# Patient Record
Sex: MALE | Race: WHITE | NOT HISPANIC OR LATINO | Employment: FULL TIME | URBAN - METROPOLITAN AREA
[De-identification: names, ages, dates, MRNs, and addresses within clinical notes are randomized per-mention and may not be internally consistent; named-entity substitution may affect disease eponyms.]

---

## 2021-03-29 PROBLEM — Z72.0 TOBACCO USE: Status: ACTIVE | Noted: 2021-03-29

## 2021-03-29 PROBLEM — E78.5 HYPERLIPIDEMIA LDL GOAL <100: Status: ACTIVE | Noted: 2021-03-29

## 2021-03-29 PROBLEM — I10 ESSENTIAL HYPERTENSION: Status: ACTIVE | Noted: 2021-03-29

## 2021-03-29 PROBLEM — R07.2 PRECORDIAL PAIN: Status: ACTIVE | Noted: 2021-03-29

## 2021-03-29 PROBLEM — K21.9 GERD WITHOUT ESOPHAGITIS: Status: ACTIVE | Noted: 2021-03-29

## 2021-03-29 PROBLEM — Z82.49 FAMILY HISTORY OF ATHEROSCLEROSIS: Status: ACTIVE | Noted: 2021-03-29

## 2021-03-29 NOTE — PROGRESS NOTES
Subjective:     Encounter Date:03/31/2021    Patient ID: Matthew Duane Robison is a 50 y.o. 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    REFERRING PROVIDER: RAMU Augero    Chief Complaint:   Chief Complaint   Patient presents with   • Chest Pain     Consult     Problem List:  1. Chest pain - uncertain etiology  a. Reported negative Stress approx. 2010  b. Residual class II symptoms with acceptable electrocardiogram, March 2021  c. Recurrent CP with increased fatigue and SOA March 2021 with plan for repeat Stress Echo GXT  2. Hypertension  3. Hyperlipidemia  a. Lipid panel December 2019 with , triglycerides 224, HDL 33, LDL 45  b. On atorvastatin  4. GERD  5. Tobacco use  a. 2 PPD > 10 years   b. Currently on Chantix with decreasing cigarette consumption.  6. Family history of atherosclerotic heart disease  7. Remote cholecystectomy     No Known Allergies    Current Outpatient Medications:   •  AMLODIPINE BENZOATE PO, Take 10 mg by mouth Every Night., Disp: , Rfl:   •  atorvastatin (LIPITOR) 20 MG tablet, Take 20 mg by mouth Daily., Disp: , Rfl:   •  bisoprolol (ZEBeta) 5 MG tablet, Take 5 mg by mouth Daily., Disp: , Rfl:   •  diphenhydrAMINE HCl (BENADRYL PO), Take  by mouth As Needed. To help sleep, Disp: , Rfl:   •  losartan-hydrochlorothiazide (HYZAAR) 100-25 MG per tablet, Take 1 tablet by mouth Daily., Disp: , Rfl:   •  omeprazole (priLOSEC) 40 MG capsule, Take 40 mg by mouth As Needed., Disp: , Rfl:   •  varenicline (CHANTIX) 1 MG tablet, Take 1 mg by mouth 2 (Two) Times a Day., Disp: , Rfl:   •  nitroglycerin (Nitrolingual) 0.4 MG/SPRAY spray, Place 1 spray under the tongue Every 5 (Five) Minutes As Needed for Chest Pain., Disp: 1 each, Rfl: 12    History of Present Illness  This is a 50-year-old white male who presents to establish care for chest pain and family history of atherosclerotic heart disease.   Upon evaluation today patient reports a 2-month history of increasing fatigue and shortness of breath with minimal activity.  Patient reports chest pain occurring 2-3 times per week primarily occurring after eating associated with acid reflux.  Patient describes chest pain as a sharp stabbing pain substernally that will radiate to his jaw.  Patient states the pain typically last 1 to 2 hours typically at rest and is relieved with antiacids.  Patient denies palpitations, dizziness, presyncope, or syncope.  Patient's blood pressure is noted to be slightly elevated on a consistent basis despite multiple antihypertensive therapies.  Patient states his blood pressure is typically high in the morning and then is improved in the afternoon when he checks his blood pressure at home.  Patient meets compliance to statin therapy without noted side effects and recent acceptable lipid panel.  Patient reports a 2 pack/day smoking history greater than 10 years for which he recently started taking Chantix for smoking cessation however is still smoking approximately half pack per day.  Patient reports having a stress test approximately 10 years ago at [facility] that was reported to be normal.    Cardiovascular Disease Risk Factors  Hypertension, hyperlipidemia, family history, tobacco abuse, male gender    Social History     Socioeconomic History   • Marital status: Single     Spouse name: Not on file   • Number of children: Not on file   • Years of education: Not on file   • Highest education level: Not on file   Tobacco Use   • Smoking status: Former Smoker     Types: Cigarettes   • Smokeless tobacco: Never Used   • Tobacco comment: on chantix   Substance and Sexual Activity   • Alcohol use: Yes     Alcohol/week: 2.0 - 4.0 standard drinks     Types: 1 - 2 Glasses of wine, 1 - 2 Shots of liquor per week     Comment: social   • Drug use: Never   • Sexual activity: Defer       Family History   Problem Relation Age of Onset    • Hypertension Mother    • Heart disease Mother    • Cancer Father        ROS   Obtained and negative except as outlined in problem list and HPI.      ECG 12 Lead    Date/Time: 3/31/2021 8:27 AM  Performed by: Jesus Michele APRN  Authorized by: Jesus Michele APRN   Comparison: not compared with previous ECG   Rhythm: sinus rhythm  BPM: 70                 Objective:       Vitals:    03/31/21 0813 03/31/21 0814 03/31/21 0815 03/31/21 0913   BP: 136/88 (!) 162/104 140/97 132/78   BP Location: Left arm Right arm Right arm Right arm   Patient Position: Standing Sitting Standing Sitting   Pulse: 79 72 80    Temp:       SpO2: 98% 98% 98%    Weight:       Height:         Body mass index is 33.45 kg/m².    Constitutional:       Appearance: Healthy appearance. Not in distress.   Eyes:      Funduscopic exam:     Right eye: No hemorrhage, exudate or AV nicking.         Left eye: No hemorrhage, exudate or AV nicking.   HENT:    Mouth/Throat:      Lips: Pink.      Mouth: Mucous membranes are moist. No injury, lacerations, oral lesions or angioedema.      Dentition: Normal dentition. Does not have dentures. No dental tenderness, gingival swelling, dental caries, dental abscesses or gum lesions.      Tongue: No lesions. Tongue does not deviate from midline.      Palate: No mass and lesions.      Pharynx: Oropharynx is clear. Uvula midline. No pharyngeal swelling, oropharyngeal exudate, posterior oropharyngeal erythema or uvula swelling.      Tonsils: No tonsillar exudate or tonsillar abscesses.   Neck:      Vascular: No JVR. JVD normal.   Pulmonary:      Effort: Pulmonary effort is normal.      Breath sounds: Normal breath sounds. No wheezing. No rhonchi. No rales.   Chest:      Chest wall: Not tender to palpatation.   Cardiovascular:      PMI at left midclavicular line. Normal rate. Regular rhythm. Normal S1. Normal S2.      Murmurs: There is no murmur.      No gallop. No click. No rub.   Pulses:     Carotid:  1+ bilaterally.     Radial: 1+ bilaterally.     Femoral: 1+ bilaterally.     Dorsalis pedis: 1+ bilaterally.     Posterior tibial: 1+ bilaterally.  Edema:     Peripheral edema absent.   Abdominal:      General: Bowel sounds are normal.      Palpations: Abdomen is soft.      Tenderness: There is no abdominal tenderness.   Musculoskeletal: Normal range of motion.         General: No tenderness. Skin:     General: Skin is warm and dry.   Neurological:      General: No focal deficit present.      Mental Status: Alert and oriented to person, place and time.         Lab Review:   12/10/2019:  · CBC: WBC 10.9, RBC 4.86, hemoglobin 15.2, hematocrit 46.1, MCV 95, MCH 31.3, MCHC 33, RDW 13.8, platelets 249, neutrophils 63, lymphocytes 23, monocytes 10, eos 2, basos 1  · CMP: Glucose 106, BUN 10, creatinine 0.83, , sodium 142, potassium 4.7, chloride 106, carbon dioxide 23, calcium 9.4, protein 6.9, albumin 4.5, globulin 2.4, bilirubin 0.3, alkaline phosphatase 67, AST 17, ALT 22  · Lipid panel: Saud 120, triglycerides 224, HDL 33, LDL 45  · CRP 3.54    ECG 03/04/2021: Sinus rhythm, normal ECG, 63 bpm,  ms,  ms,  ms    Assessment:       Recent progressive exertional dyspnea and chest pain syndrome, suggestive of possible angina pectoris. Will need screening with echo GXT. Defer cardiac calcium score as well as LHC at this time.      Diagnosis Plan   1. Precordial pain  Echo GXT w/ continuous oxymetry. NTG spray PRN   2. Essential hypertension  Controlled. Continue current treatment and initiate amlodipine 10 mg qhs. Patient should monitor her blood pressure at home and notify our office if her readings are consistently greater than 140/90 mmHg.   3. Hyperlipidemia LDL goal <100  Controlled. Continue atorvastatin.   4. GERD without esophagitis  Uncontrolled. Educated on avoiding high fat/charbohydrate diet. Continue Prilosec QAM on empty stomach. PRN OTC Pepcid    5. Tobacco use  Cessation strongly  encouraged.          Plan:       1. Stress echo GXT to rule out ischemia  2. Patient to continue current medications and close follow up with the above providers.  3. Tentative cardiology follow up in May 2021 or patient may return sooner PRN.  4. Tobacco cessation encouraged      Scribed for Elia Robertson MD by Jesus Michele, APRN . 3/31/2021  09:17 EDT    I, Elia Robertson MD, [facility], personally performed the services described in this documentation as scribed by the above named individual in my presence, and it is both accurate and complete. At 10:39 EDT on 03/31/2021

## 2021-03-31 ENCOUNTER — CONSULT (OUTPATIENT)
Dept: CARDIOLOGY | Facility: CLINIC | Age: 51
End: 2021-03-31

## 2021-03-31 VITALS
TEMPERATURE: 97 F | HEIGHT: 71 IN | BODY MASS INDEX: 33.57 KG/M2 | OXYGEN SATURATION: 98 % | WEIGHT: 239.8 LBS | DIASTOLIC BLOOD PRESSURE: 78 MMHG | HEART RATE: 80 BPM | SYSTOLIC BLOOD PRESSURE: 132 MMHG

## 2021-03-31 DIAGNOSIS — Z72.0 TOBACCO USE: ICD-10-CM

## 2021-03-31 DIAGNOSIS — K21.9 GERD WITHOUT ESOPHAGITIS: ICD-10-CM

## 2021-03-31 DIAGNOSIS — E78.5 HYPERLIPIDEMIA LDL GOAL <100: ICD-10-CM

## 2021-03-31 DIAGNOSIS — R07.2 PRECORDIAL PAIN: Primary | ICD-10-CM

## 2021-03-31 DIAGNOSIS — I10 ESSENTIAL HYPERTENSION: ICD-10-CM

## 2021-03-31 PROCEDURE — 93000 ELECTROCARDIOGRAM COMPLETE: CPT | Performed by: INTERNAL MEDICINE

## 2021-03-31 PROCEDURE — 99204 OFFICE O/P NEW MOD 45 MIN: CPT | Performed by: INTERNAL MEDICINE

## 2021-03-31 RX ORDER — ATORVASTATIN CALCIUM 20 MG/1
20 TABLET, FILM COATED ORAL DAILY
COMMUNITY

## 2021-03-31 RX ORDER — LOSARTAN POTASSIUM AND HYDROCHLOROTHIAZIDE 25; 100 MG/1; MG/1
1 TABLET ORAL DAILY
COMMUNITY

## 2021-03-31 RX ORDER — VARENICLINE TARTRATE 1 MG/1
1 TABLET, FILM COATED ORAL 2 TIMES DAILY
COMMUNITY

## 2021-03-31 RX ORDER — NITROGLYCERIN 400 UG/1
1 SPRAY ORAL
Qty: 1 EACH | Refills: 12 | Status: SHIPPED | OUTPATIENT
Start: 2021-03-31

## 2021-03-31 RX ORDER — BISOPROLOL FUMARATE 5 MG/1
5 TABLET, FILM COATED ORAL DAILY
COMMUNITY

## 2021-03-31 RX ORDER — OMEPRAZOLE 40 MG/1
40 CAPSULE, DELAYED RELEASE ORAL AS NEEDED
COMMUNITY

## 2021-04-13 ENCOUNTER — APPOINTMENT (OUTPATIENT)
Dept: PREADMISSION TESTING | Facility: HOSPITAL | Age: 51
End: 2021-04-13

## 2021-04-13 PROCEDURE — U0004 COV-19 TEST NON-CDC HGH THRU: HCPCS

## 2021-04-13 PROCEDURE — C9803 HOPD COVID-19 SPEC COLLECT: HCPCS

## 2021-04-14 LAB — SARS-COV-2 RNA PNL SPEC NAA+PROBE: NOT DETECTED

## 2021-04-16 ENCOUNTER — HOSPITAL ENCOUNTER (OUTPATIENT)
Dept: CARDIOLOGY | Facility: HOSPITAL | Age: 51
Discharge: HOME | End: 2021-04-16
Admitting: INTERNAL MEDICINE

## 2021-04-16 VITALS
BODY MASS INDEX: 33.64 KG/M2 | SYSTOLIC BLOOD PRESSURE: 130 MMHG | WEIGHT: 240.3 LBS | DIASTOLIC BLOOD PRESSURE: 90 MMHG | HEART RATE: 80 BPM | HEIGHT: 71 IN

## 2021-04-16 DIAGNOSIS — I10 ESSENTIAL HYPERTENSION: ICD-10-CM

## 2021-04-16 DIAGNOSIS — R07.2 PRECORDIAL PAIN: ICD-10-CM

## 2021-04-16 DIAGNOSIS — E78.5 HYPERLIPIDEMIA LDL GOAL <100: ICD-10-CM

## 2021-04-16 LAB
BH CV ECHO MEAS - BSA(HAYCOCK): 2.4 M^2
BH CV ECHO MEAS - BSA: 2.3 M^2
BH CV ECHO MEAS - BZI_BMI: 33.3 KILOGRAMS/M^2
BH CV ECHO MEAS - BZI_METRIC_HEIGHT: 180.3 CM
BH CV ECHO MEAS - BZI_METRIC_WEIGHT: 108.4 KG
BH CV ECHO MEAS - EDV(CUBED): 66 ML
BH CV ECHO MEAS - EDV(TEICH): 71.7 ML
BH CV ECHO MEAS - EF(CUBED): 68.2 %
BH CV ECHO MEAS - EF(TEICH): 60.3 %
BH CV ECHO MEAS - ESV(CUBED): 21 ML
BH CV ECHO MEAS - ESV(TEICH): 28.5 ML
BH CV ECHO MEAS - FS: 31.7 %
BH CV ECHO MEAS - IVS/LVPW: 1.1
BH CV ECHO MEAS - IVSD: 1.3 CM
BH CV ECHO MEAS - LA DIMENSION: 3.7 CM
BH CV ECHO MEAS - LV MASS(C)D: 186.3 GRAMS
BH CV ECHO MEAS - LV MASS(C)DI: 81.9 GRAMS/M^2
BH CV ECHO MEAS - LV MAX PG: 3.7 MMHG
BH CV ECHO MEAS - LV MEAN PG: 1.7 MMHG
BH CV ECHO MEAS - LV V1 MAX: 96.3 CM/SEC
BH CV ECHO MEAS - LV V1 MEAN: 60.1 CM/SEC
BH CV ECHO MEAS - LV V1 VTI: 23.5 CM
BH CV ECHO MEAS - LVIDD: 4 CM
BH CV ECHO MEAS - LVIDS: 2.6 CM
BH CV ECHO MEAS - LVPWD: 1.2 CM
BH CV ECHO MEAS - MV A MAX VEL: 99.7 CM/SEC
BH CV ECHO MEAS - MV DEC TIME: 0.15 SEC
BH CV ECHO MEAS - MV E MAX VEL: 62.2 CM/SEC
BH CV ECHO MEAS - MV E/A: 0.62
BH CV ECHO MEAS - PA ACC SLOPE: 879.3 CM/SEC^2
BH CV ECHO MEAS - PA ACC TIME: 0.08 SEC
BH CV ECHO MEAS - PA PR(ACCEL): 44.1 MMHG
BH CV ECHO MEAS - PI END-D VEL: 0 CM/SEC
BH CV ECHO MEAS - RAP SYSTOLE: 3 MMHG
BH CV ECHO MEAS - RVSP: 25 MMHG
BH CV ECHO MEAS - SI(CUBED): 19.8 ML/M^2
BH CV ECHO MEAS - SI(TEICH): 19 ML/M^2
BH CV ECHO MEAS - SV(CUBED): 45 ML
BH CV ECHO MEAS - SV(TEICH): 43.3 ML
BH CV ECHO MEAS - TR MAX PG: 22 MMHG
BH CV ECHO MEAS - TR MAX VEL: 230.1 CM/SEC
BH CV STRESS BP STAGE 1: NORMAL
BH CV STRESS BP STAGE 2: NORMAL
BH CV STRESS BP STAGE 3: NORMAL
BH CV STRESS DURATION MIN STAGE 1: 3
BH CV STRESS DURATION MIN STAGE 2: 3
BH CV STRESS DURATION MIN STAGE 3: 2
BH CV STRESS DURATION SEC STAGE 1: 0
BH CV STRESS DURATION SEC STAGE 2: 0
BH CV STRESS DURATION SEC STAGE 3: 32
BH CV STRESS ECHO POST STRESS EJECTION FRACTION EF: 72 %
BH CV STRESS GRADE STAGE 1: 10
BH CV STRESS GRADE STAGE 2: 12
BH CV STRESS GRADE STAGE 3: 14
BH CV STRESS HR STAGE 1: 102
BH CV STRESS HR STAGE 2: 134
BH CV STRESS HR STAGE 3: 150
BH CV STRESS METS STAGE 1: 5
BH CV STRESS METS STAGE 2: 7.5
BH CV STRESS METS STAGE 3: 10
BH CV STRESS O2 STAGE 1: 96
BH CV STRESS O2 STAGE 2: 94
BH CV STRESS O2 STAGE 3: 96
BH CV STRESS PROTOCOL 1: NORMAL
BH CV STRESS RECOVERY BP: NORMAL MMHG
BH CV STRESS RECOVERY HR: 96 BPM
BH CV STRESS RECOVERY O2: 99 %
BH CV STRESS SPEED STAGE 1: 1.7
BH CV STRESS SPEED STAGE 2: 2.5
BH CV STRESS SPEED STAGE 3: 3.4
BH CV STRESS STAGE 1: 1
BH CV STRESS STAGE 2: 2
BH CV STRESS STAGE 3: 3
BH CV VAS BP LEFT ARM: NORMAL MMHG
LV EF 2D ECHO EST: 65 %
MAXIMAL PREDICTED HEART RATE: 171 BPM
PERCENT MAX PREDICTED HR: 89.47 %
STRESS BASELINE BP: NORMAL MMHG
STRESS BASELINE HR: 81 BPM
STRESS O2 SAT REST: 96 %
STRESS PERCENT HR: 105 %
STRESS POST ESTIMATED WORKLOAD: 10.4 METS
STRESS POST EXERCISE DUR MIN: 8 MIN
STRESS POST EXERCISE DUR SEC: 32 SEC
STRESS POST O2 SAT PEAK: 96 %
STRESS POST PEAK BP: NORMAL MMHG
STRESS POST PEAK HR: 153 BPM
STRESS TARGET HR: 145 BPM

## 2021-04-16 PROCEDURE — 93350 STRESS TTE ONLY: CPT

## 2021-04-16 PROCEDURE — 25010000002 SULFUR HEXAFLUORIDE MICROSPH 60.7-25 MG RECONSTITUTED SUSPENSION: Performed by: INTERNAL MEDICINE

## 2021-04-16 PROCEDURE — 93017 CV STRESS TEST TRACING ONLY: CPT

## 2021-04-16 PROCEDURE — 93352 ADMIN ECG CONTRAST AGENT: CPT | Performed by: INTERNAL MEDICINE

## 2021-04-16 PROCEDURE — 93018 CV STRESS TEST I&R ONLY: CPT | Performed by: INTERNAL MEDICINE

## 2021-04-16 PROCEDURE — 93350 STRESS TTE ONLY: CPT | Performed by: INTERNAL MEDICINE

## 2021-04-16 RX ADMIN — SULFUR HEXAFLUORIDE 5 ML: KIT at 15:10
